# Patient Record
Sex: MALE | Race: WHITE | Employment: FULL TIME | URBAN - METROPOLITAN AREA
[De-identification: names, ages, dates, MRNs, and addresses within clinical notes are randomized per-mention and may not be internally consistent; named-entity substitution may affect disease eponyms.]

---

## 2018-11-07 ENCOUNTER — ANESTHESIA (OUTPATIENT)
Dept: CARDIAC CATH/INVASIVE PROCEDURES | Age: 58
End: 2018-11-07

## 2018-11-07 ENCOUNTER — HOSPITAL ENCOUNTER (OUTPATIENT)
Dept: CARDIAC CATH/INVASIVE PROCEDURES | Age: 58
Discharge: HOME | End: 2018-11-07
Payer: COMMERCIAL

## 2018-11-07 ENCOUNTER — ANESTHESIA EVENT (OUTPATIENT)
Dept: CARDIAC CATH/INVASIVE PROCEDURES | Age: 58
End: 2018-11-07

## 2018-11-07 VITALS
SYSTOLIC BLOOD PRESSURE: 122 MMHG | DIASTOLIC BLOOD PRESSURE: 66 MMHG | HEART RATE: 65 BPM | RESPIRATION RATE: 20 BRPM | BODY MASS INDEX: 25.06 KG/M2 | TEMPERATURE: 97.4 F | OXYGEN SATURATION: 97 % | WEIGHT: 185 LBS | HEIGHT: 72 IN

## 2018-11-07 VITALS — OXYGEN SATURATION: 96 % | SYSTOLIC BLOOD PRESSURE: 100 MMHG | DIASTOLIC BLOOD PRESSURE: 54 MMHG | TEMPERATURE: 95.4 F

## 2018-11-07 LAB
ACTIVATED CLOTTING TIME: 249 SEC (ref 79–149)
ACTIVATED CLOTTING TIME: 298 SEC (ref 79–149)
ACTIVATED CLOTTING TIME: 318 SEC (ref 79–149)
EKG ATRIAL RATE: 49 BPM
EKG P AXIS: 49 DEGREES
EKG P-R INTERVAL: 206 MS
EKG Q-T INTERVAL: 432 MS
EKG QRS DURATION: 110 MS
EKG QTC CALCULATION (BAZETT): 390 MS
EKG R AXIS: -34 DEGREES
EKG T AXIS: 50 DEGREES
EKG VENTRICULAR RATE: 49 BPM

## 2018-11-07 PROCEDURE — 2580000003 HC RX 258: Performed by: SPECIALIST

## 2018-11-07 PROCEDURE — 6360000002 HC RX W HCPCS

## 2018-11-07 PROCEDURE — 2709999900 HC NON-CHARGEABLE SUPPLY

## 2018-11-07 PROCEDURE — 85347 COAGULATION TIME ACTIVATED: CPT

## 2018-11-07 PROCEDURE — 93005 ELECTROCARDIOGRAM TRACING: CPT

## 2018-11-07 PROCEDURE — 6360000002 HC RX W HCPCS: Performed by: SPECIALIST

## 2018-11-07 PROCEDURE — 7100000010 HC PHASE II RECOVERY - FIRST 15 MIN

## 2018-11-07 PROCEDURE — 93613 INTRACARDIAC EPHYS 3D MAPG: CPT | Performed by: INTERNAL MEDICINE

## 2018-11-07 PROCEDURE — C1730 CATH, EP, 19 OR FEW ELECT: HCPCS

## 2018-11-07 PROCEDURE — 2500000003 HC RX 250 WO HCPCS: Performed by: SPECIALIST

## 2018-11-07 PROCEDURE — 33282 HC IMPANTABLE LOOP RECORDER: CPT | Performed by: INTERNAL MEDICINE

## 2018-11-07 PROCEDURE — 7100000011 HC PHASE II RECOVERY - ADDTL 15 MIN

## 2018-11-07 PROCEDURE — 93662 INTRACARDIAC ECG (ICE): CPT | Performed by: INTERNAL MEDICINE

## 2018-11-07 PROCEDURE — C1732 CATH, EP, DIAG/ABL, 3D/VECT: HCPCS

## 2018-11-07 PROCEDURE — C1733 CATH, EP, OTHR THAN COOL-TIP: HCPCS

## 2018-11-07 PROCEDURE — C1759 CATH, INTRA ECHOCARDIOGRAPHY: HCPCS

## 2018-11-07 PROCEDURE — 3700000000 HC ANESTHESIA ATTENDED CARE

## 2018-11-07 PROCEDURE — 2720000010 HC SURG SUPPLY STERILE

## 2018-11-07 PROCEDURE — 93656 COMPRE EP EVAL ABLTJ ATR FIB: CPT | Performed by: INTERNAL MEDICINE

## 2018-11-07 PROCEDURE — C1769 GUIDE WIRE: HCPCS

## 2018-11-07 PROCEDURE — C1894 INTRO/SHEATH, NON-LASER: HCPCS

## 2018-11-07 PROCEDURE — C1764 EVENT RECORDER, CARDIAC: HCPCS

## 2018-11-07 PROCEDURE — 6360000004 HC RX CONTRAST MEDICATION

## 2018-11-07 PROCEDURE — C1725 CATH, TRANSLUMIN NON-LASER: HCPCS

## 2018-11-07 PROCEDURE — 3700000001 HC ADD 15 MINUTES (ANESTHESIA)

## 2018-11-07 RX ORDER — MIDAZOLAM HYDROCHLORIDE 1 MG/ML
INJECTION INTRAMUSCULAR; INTRAVENOUS PRN
Status: DISCONTINUED | OUTPATIENT
Start: 2018-11-07 | End: 2018-11-07 | Stop reason: SDUPTHER

## 2018-11-07 RX ORDER — LIDOCAINE HYDROCHLORIDE 10 MG/ML
INJECTION, SOLUTION INFILTRATION; PERINEURAL PRN
Status: DISCONTINUED | OUTPATIENT
Start: 2018-11-07 | End: 2018-11-07 | Stop reason: SDUPTHER

## 2018-11-07 RX ORDER — SODIUM CHLORIDE 0.9 % (FLUSH) 0.9 %
10 SYRINGE (ML) INJECTION PRN
Status: DISCONTINUED | OUTPATIENT
Start: 2018-11-07 | End: 2018-11-08 | Stop reason: HOSPADM

## 2018-11-07 RX ORDER — METOPROLOL SUCCINATE 25 MG/1
25 TABLET, EXTENDED RELEASE ORAL DAILY
COMMUNITY

## 2018-11-07 RX ORDER — ACETAMINOPHEN 325 MG/1
650 TABLET ORAL EVERY 4 HOURS PRN
Status: DISCONTINUED | OUTPATIENT
Start: 2018-11-07 | End: 2018-11-08 | Stop reason: HOSPADM

## 2018-11-07 RX ORDER — FENTANYL CITRATE 50 UG/ML
INJECTION, SOLUTION INTRAMUSCULAR; INTRAVENOUS PRN
Status: DISCONTINUED | OUTPATIENT
Start: 2018-11-07 | End: 2018-11-07 | Stop reason: SDUPTHER

## 2018-11-07 RX ORDER — ROCURONIUM BROMIDE 10 MG/ML
INJECTION, SOLUTION INTRAVENOUS PRN
Status: DISCONTINUED | OUTPATIENT
Start: 2018-11-07 | End: 2018-11-07 | Stop reason: SDUPTHER

## 2018-11-07 RX ORDER — PROPOFOL 10 MG/ML
INJECTION, EMULSION INTRAVENOUS PRN
Status: DISCONTINUED | OUTPATIENT
Start: 2018-11-07 | End: 2018-11-07 | Stop reason: SDUPTHER

## 2018-11-07 RX ORDER — GLYCOPYRROLATE 1 MG/5 ML
SYRINGE (ML) INTRAVENOUS PRN
Status: DISCONTINUED | OUTPATIENT
Start: 2018-11-07 | End: 2018-11-07 | Stop reason: SDUPTHER

## 2018-11-07 RX ORDER — SODIUM CHLORIDE 9 MG/ML
INJECTION, SOLUTION INTRAVENOUS CONTINUOUS PRN
Status: DISCONTINUED | OUTPATIENT
Start: 2018-11-07 | End: 2018-11-07 | Stop reason: SDUPTHER

## 2018-11-07 RX ORDER — PROTAMINE SULFATE 10 MG/ML
INJECTION, SOLUTION INTRAVENOUS PRN
Status: DISCONTINUED | OUTPATIENT
Start: 2018-11-07 | End: 2018-11-07 | Stop reason: SDUPTHER

## 2018-11-07 RX ORDER — SODIUM CHLORIDE 0.9 % (FLUSH) 0.9 %
10 SYRINGE (ML) INJECTION EVERY 12 HOURS SCHEDULED
Status: DISCONTINUED | OUTPATIENT
Start: 2018-11-07 | End: 2018-11-08 | Stop reason: HOSPADM

## 2018-11-07 RX ORDER — SODIUM CHLORIDE 9 MG/ML
INJECTION, SOLUTION INTRAVENOUS CONTINUOUS
Status: DISCONTINUED | OUTPATIENT
Start: 2018-11-07 | End: 2018-11-08 | Stop reason: HOSPADM

## 2018-11-07 RX ORDER — MECLIZINE HCL 12.5 MG/1
12.5 TABLET ORAL 2 TIMES DAILY
COMMUNITY

## 2018-11-07 RX ORDER — NEOSTIGMINE METHYLSULFATE 5 MG/5 ML
SYRINGE (ML) INTRAVENOUS PRN
Status: DISCONTINUED | OUTPATIENT
Start: 2018-11-07 | End: 2018-11-07 | Stop reason: SDUPTHER

## 2018-11-07 RX ORDER — LISINOPRIL 2.5 MG/1
2.5 TABLET ORAL DAILY
COMMUNITY

## 2018-11-07 RX ORDER — HEPARIN SODIUM 1000 [USP'U]/ML
INJECTION, SOLUTION INTRAVENOUS; SUBCUTANEOUS PRN
Status: DISCONTINUED | OUTPATIENT
Start: 2018-11-07 | End: 2018-11-07 | Stop reason: SDUPTHER

## 2018-11-07 RX ADMIN — FENTANYL CITRATE 50 MCG: 50 INJECTION INTRAMUSCULAR; INTRAVENOUS at 11:48

## 2018-11-07 RX ADMIN — ROCURONIUM BROMIDE 40 MG: 10 INJECTION INTRAVENOUS at 11:53

## 2018-11-07 RX ADMIN — PHENYLEPHRINE HYDROCHLORIDE 50 MCG/MIN: 10 INJECTION INTRAVENOUS at 12:29

## 2018-11-07 RX ADMIN — FENTANYL CITRATE 50 MCG: 50 INJECTION INTRAMUSCULAR; INTRAVENOUS at 11:53

## 2018-11-07 RX ADMIN — SODIUM CHLORIDE: 9 INJECTION, SOLUTION INTRAVENOUS at 11:02

## 2018-11-07 RX ADMIN — MIDAZOLAM HYDROCHLORIDE 2 MG: 1 INJECTION, SOLUTION INTRAMUSCULAR; INTRAVENOUS at 11:47

## 2018-11-07 RX ADMIN — HEPARIN SODIUM 12000 UNITS: 1000 INJECTION, SOLUTION INTRAVENOUS; SUBCUTANEOUS at 12:17

## 2018-11-07 RX ADMIN — PROPOFOL 150 MG: 10 INJECTION, EMULSION INTRAVENOUS at 11:53

## 2018-11-07 RX ADMIN — PHENYLEPHRINE HYDROCHLORIDE 200 MCG: 10 INJECTION INTRAVENOUS at 12:19

## 2018-11-07 RX ADMIN — Medication 4 MG: at 13:36

## 2018-11-07 RX ADMIN — Medication 250 MG: at 13:30

## 2018-11-07 RX ADMIN — HEPARIN SODIUM 5000 UNITS: 1000 INJECTION, SOLUTION INTRAVENOUS; SUBCUTANEOUS at 12:30

## 2018-11-07 RX ADMIN — PHENYLEPHRINE HYDROCHLORIDE 200 MCG: 10 INJECTION INTRAVENOUS at 12:34

## 2018-11-07 RX ADMIN — LIDOCAINE HYDROCHLORIDE 50 MG: 10 INJECTION, SOLUTION INFILTRATION; PERINEURAL at 11:53

## 2018-11-07 RX ADMIN — Medication 0.8 MG: at 13:34

## 2018-11-07 RX ADMIN — SODIUM CHLORIDE: 900 INJECTION INTRAVENOUS at 11:42

## 2018-11-07 RX ADMIN — PROTAMINE SULFATE 50 MG: 10 INJECTION, SOLUTION INTRAVENOUS at 13:24

## 2018-11-07 ASSESSMENT — PULMONARY FUNCTION TESTS
PIF_VALUE: 14
PIF_VALUE: 1
PIF_VALUE: 14
PIF_VALUE: 1
PIF_VALUE: 14
PIF_VALUE: 4
PIF_VALUE: 14
PIF_VALUE: 15
PIF_VALUE: 14
PIF_VALUE: 12
PIF_VALUE: 14
PIF_VALUE: 15
PIF_VALUE: 14
PIF_VALUE: 13
PIF_VALUE: 1
PIF_VALUE: 14
PIF_VALUE: 14
PIF_VALUE: 4
PIF_VALUE: 13
PIF_VALUE: 14
PIF_VALUE: 4
PIF_VALUE: 14
PIF_VALUE: 13
PIF_VALUE: 14
PIF_VALUE: 15
PIF_VALUE: 14
PIF_VALUE: 15
PIF_VALUE: 3
PIF_VALUE: 1
PIF_VALUE: 1
PIF_VALUE: 2
PIF_VALUE: 14
PIF_VALUE: 4
PIF_VALUE: 14
PIF_VALUE: 5
PIF_VALUE: 14
PIF_VALUE: 4
PIF_VALUE: 14
PIF_VALUE: 14
PIF_VALUE: 1
PIF_VALUE: 13
PIF_VALUE: 15
PIF_VALUE: 13
PIF_VALUE: 13
PIF_VALUE: 14
PIF_VALUE: 13
PIF_VALUE: 13
PIF_VALUE: 4
PIF_VALUE: 14
PIF_VALUE: 13
PIF_VALUE: 14
PIF_VALUE: 13
PIF_VALUE: 0
PIF_VALUE: 14
PIF_VALUE: 15
PIF_VALUE: 10
PIF_VALUE: 4
PIF_VALUE: 14
PIF_VALUE: 13
PIF_VALUE: 2
PIF_VALUE: 14
PIF_VALUE: 0
PIF_VALUE: 14
PIF_VALUE: 2
PIF_VALUE: 13
PIF_VALUE: 13
PIF_VALUE: 14
PIF_VALUE: 1
PIF_VALUE: 14
PIF_VALUE: 14
PIF_VALUE: 13
PIF_VALUE: 14
PIF_VALUE: 13
PIF_VALUE: 14
PIF_VALUE: 19
PIF_VALUE: 14
PIF_VALUE: 3
PIF_VALUE: 13
PIF_VALUE: 13
PIF_VALUE: 4
PIF_VALUE: 1
PIF_VALUE: 14
PIF_VALUE: 13
PIF_VALUE: 14
PIF_VALUE: 14
PIF_VALUE: 1
PIF_VALUE: 14
PIF_VALUE: 13
PIF_VALUE: 17
PIF_VALUE: 12
PIF_VALUE: 4
PIF_VALUE: 14
PIF_VALUE: 12
PIF_VALUE: 14
PIF_VALUE: 14
PIF_VALUE: 13
PIF_VALUE: 13
PIF_VALUE: 14
PIF_VALUE: 13
PIF_VALUE: 13
PIF_VALUE: 14

## 2018-11-07 NOTE — OP NOTE
PROCEDURE:  Loop Recorder Implantation    PERFORMED BY:  Frances Boone M.D. INDICATIONS:atrial fibrillation. Brief history: The risks, indications, benefits, as well as alternatives of the procedure  had been discussed with the patient. The risks include bleeding,  infection, and chronic pain at the site. The patient understood and wished  to proceed. The area of the left parasternal region was infiltrated with 1% lidocaine. A 1cm incision was made and the loop recorder was implanted in the left  parasternal region. This was a [employer], serial number  [id number]. Sensing was 0.9mV. The patient tolerated the procedure well without any apparent complications. IMPRESSION:  Successful implantation of a loop recorder for atrial fibrillation.
cycle length was 490 ms. AV node ERP was 440 ms at a 700 ms drivetrain. A 3D map of the left atrium was formed using the CARTO mapping system. All 4  pulmonary veins were identified and mapped. The left atrial appendage was  also identified. I then exchanged the SL-1 sheath for the cryo sheath. The cryoablation catheter was then into the left superior pulmonary vein. Cryoablation of the left superior pulmonary vein was done. Two ablations  were done,  180 seconds and then for 120ms. The lowest temperature recorded was -52  degrees Celsius. This vein was completely isolated after cryoablation. There was no change in esophageal temperature. The left inferior pulmonary vein was then accessed. A single ablation was done with complete isolation of the vein. The lowest temperature recorded was -47  degrees Celsius. Again, there was no change in esophageal temperature. Next, we accessed the right inferior pulmonary vein. Cryoablation was done here. A single cryoablation was done with complete venous isolation. We then went up to the right superior pulmonary vein. Two cryoablations were done here. Please note that during ablation on the  right pulmonary veins the spiral catheter was advanced to the superior vena  cava and the phrenic nerve was continuously paced during the procedure. The lowest temperature recorded in the right superior vein was -43 degrees  Celsius. Burst pacing from the coronary sinus was done after about 200 ms and atrial fibrillation was induced. Cryoablation of the roof was done to isolate the roof. Patient tolerated the procedure well without any apparent complications. All the veins were again mapped with the spiral catheter and all veins were completely isolated. The procedure was then concluded at this time. IMPRESSION:  1. Successful cryoablation of atrial fibrillation. 2.  Comprehensive electrophysiology study.   3.  A 3D mapping with an ABBY mapping

## 2018-11-07 NOTE — PROGRESS NOTES
Figure eight suture removed, band aid applied. Groin soft, no bleeding or hematoma noted.   Pedal pulses palpable

## 2018-11-07 NOTE — ANESTHESIA PROCEDURE NOTES
Arterial Line:    An arterial line was placed using surface landmarks, in the pre-op for the following indication(s): continuous blood pressure monitoring. A 20 gauge (size), 1 and 3/4 inch (length), Arrow (type) catheter was placed, Seldinger technique used, into the right radial artery, secured by Tegaderm and tape. Anesthesia type: General    Events:  patient tolerated procedure well with no complications.   11/7/2018 11:58 AM11/7/2018 12:00 PM  Resident/CRNA: FRANKIE MESSER  Performed: Resident/CRNA and Anesthesiologist   Preanesthetic Checklist  Completed: patient identified, site marked, surgical consent, pre-op evaluation, timeout performed, IV checked, risks and benefits discussed, monitors and equipment checked, anesthesia consent given, oxygen available and patient being monitored

## 2018-11-07 NOTE — H&P
Marielena Strong MD       [facility] Meds:   Current Facility-Administered Medications   Medication Dose Route Frequency Provider Last Rate Last Dose    acetaminophen (TYLENOL) tablet 650 mg 650 mg oral Q4H PRN Caesar Passe,  mg at 10/28/18 2028   Or    acetaminophen (TYLENOL) suppository 650 mg 650 mg rectal Q4H PRN Caesar Passe, DO    acetaminophen (TYLENOL) tablet 325 mg 325 mg oral Q6H PRN Kimberly Asencio MD    aspirin EC tablet 81 mg 81 mg oral Daily Nasar Ali, DO 81 mg at 10/29/18 3145    lisinopril (PRINIVIL,ZESTRIL) tablet 2.5 mg 2.5 mg oral Daily Qing Montanez MD    metoprolol succinate XL (TOPROL-XL) 24 hr tablet 25 mg 25 mg oral Daily Qing Montanez MD    ondansetron (PF) (ZOFRAN) injection 4 mg 4 mg intravenous Q6H PRN Néstorar Passe, DO    rivaroxaban (XARELTO) tablet 20 mg 20 mg oral Daily with dinner Katherine Wynn MD 20 mg at 10/28/18 1819    scopolamine (TRANSDERM-SCOP) 1 mg/3 days 1 patch 1 patch transdermal Q72H Marielena Strong MD 1 patch at 10/27/18 1740    sennosides-docusate sodium (SENOKOT-S) 8.6-50 mg 1 tablet 1 tablet oral Q12H PRN Néstorar Passe, DO     Social History:   Social History     Socioeconomic History    Marital status:    Spouse name: Not on file    Number of children: Not on file    Years of education: Not on file    Highest education level: Not on file   Social Needs    Financial resource strain: Not on file    Food insecurity - worry: Not on file    Food insecurity - inability: Not on file   [employer] needs - medical: Not on file   [employer] needs - non-medical: Not on file   Occupational History    Not on file   Tobacco Use    Smoking status: Never Smoker    Smokeless tobacco: Never Used   Substance and Sexual Activity    Alcohol use:  Yes   Alcohol/week: 0.0 oz    Drug use: No    Sexual activity: Yes   Partners: Female   Other Topics Concern    Not on file   Social History Narrative    Not on file     Family History:   Family History   Problem

## 2018-11-07 NOTE — ANESTHESIA PRE PROCEDURE
Department of Anesthesiology  Preprocedure Note       Name:  Mark Anthony Burnett   Age:  66 y.o.  :  1960                                          MRN:  [id number]         Date:  2018      Surgeon: * Surgery not found *    Procedure: LOLI A-FIB ABLATION W/ANES    Medications prior to admission:   Prior to Admission medications    Not on File       Current medications:    No current outpatient prescriptions on file. Current Facility-Administered Medications   Medication Dose Route Frequency Provider Last Rate Last Dose    0.9 % sodium chloride infusion   Intravenous Continuous Ivy Mujica MD           Allergies: Allergies   Allergen Reactions    Shrimp (Diagnostic)      Upset stomach       Problem List:  There is no problem list on file for this patient. Past Medical History:        Diagnosis Date    Atrial fibrillation ([facility] [address] 2017    Cardiomyopathy ([facility] [address]     Eczema     History of labyrinthitis     Vertigo        Past Surgical History:  No past surgical history on file. Social History:    Social History   Substance Use Topics    Smoking status: Not on file    Smokeless tobacco: Not on file    Alcohol use Not on file                                Counseling given: Not Answered      Vital Signs (Current):   Vitals:    18 1015   BP: 120/83   Pulse: 54   Resp: 16   Temp: 98 °F (36.7 °C)   TempSrc: Oral   SpO2: 100%   Weight: 185 lb (83.9 kg)   Height: 5' 11.5\" (1.816 m)                                              BP Readings from Last 3 Encounters:   18 120/83       NPO Status: Time of last liquid consumption:                         Time of last solid consumption:                         Date of last liquid consumption: 18                        Date of last solid food consumption: 18    BMI:   Wt Readings from Last 3 Encounters:   18 185 lb (83.9 kg)     Body mass index is 25.44 kg/m².     CBC: No results found for: WBC, RBC, HGB, HCT, MCV, RDW,

## 2018-11-07 NOTE — PROGRESS NOTES
Right radial artline removed from. Manual pressure held for 10 minutes. Pressure dressing applied, site sift, no bleeding or hematoma note.

## 2018-11-08 LAB
EKG ATRIAL RATE: 63 BPM
EKG P AXIS: 57 DEGREES
EKG P-R INTERVAL: 184 MS
EKG Q-T INTERVAL: 418 MS
EKG QRS DURATION: 110 MS
EKG QTC CALCULATION (BAZETT): 427 MS
EKG R AXIS: -9 DEGREES
EKG T AXIS: 53 DEGREES
EKG VENTRICULAR RATE: 63 BPM

## 2023-03-02 ENCOUNTER — TELEPHONE (OUTPATIENT)
Dept: DERMATOLOGY | Age: 63
End: 2023-03-02

## 2023-03-02 NOTE — TELEPHONE ENCOUNTER
Re: Yesi Sánchez,  64 ----  Patient called the afterhours on 3/2/23 at 6:16pm, to cancel the appointment with Oskar VO, scheduled at the [facility] location tomorrow, 3/3/23 at 9:45am, due to a work conflict. Pt states he is a  and the school is on a 2hr delay. Patient mentioned he would like to get a later appt. Writer referred him to call office. Pt's ph: [phone] to further discuss.   EM

## 2023-03-07 ENCOUNTER — OFFICE VISIT (OUTPATIENT)
Dept: DERMATOLOGY | Age: 63
End: 2023-03-07
Payer: COMMERCIAL

## 2023-03-07 VITALS
HEIGHT: 71 IN | BODY MASS INDEX: 26.99 KG/M2 | HEART RATE: 54 BPM | WEIGHT: 192.8 LBS | OXYGEN SATURATION: 99 % | TEMPERATURE: 98.7 F | SYSTOLIC BLOOD PRESSURE: 105 MMHG | DIASTOLIC BLOOD PRESSURE: 68 MMHG

## 2023-03-07 DIAGNOSIS — Z80.8 FAMILY HISTORY OF MELANOMA: Primary | ICD-10-CM

## 2023-03-07 DIAGNOSIS — D48.9 NEOPLASM OF UNCERTAIN BEHAVIOR: ICD-10-CM

## 2023-03-07 DIAGNOSIS — L57.0 ACTINIC KERATOSES: ICD-10-CM

## 2023-03-07 DIAGNOSIS — L82.1 SEBORRHEIC KERATOSES: ICD-10-CM

## 2023-03-07 DIAGNOSIS — D22.9 MULTIPLE BENIGN NEVI: ICD-10-CM

## 2023-03-07 PROCEDURE — 99203 OFFICE O/P NEW LOW 30 MIN: CPT | Performed by: PHYSICIAN ASSISTANT

## 2023-03-07 PROCEDURE — 11102 TANGNTL BX SKIN SINGLE LES: CPT | Performed by: PHYSICIAN ASSISTANT

## 2023-03-07 RX ORDER — ASPIRIN 325 MG
325 TABLET ORAL DAILY
COMMUNITY

## 2023-03-07 RX ORDER — FLUOROURACIL 50 MG/G
CREAM TOPICAL
Qty: 40 G | Refills: 0 | Status: SHIPPED | OUTPATIENT
Start: 2023-03-07

## 2023-03-07 RX ORDER — LIDOCAINE HYDROCHLORIDE 10 MG/ML
0.5 INJECTION, SOLUTION INFILTRATION; PERINEURAL ONCE
Status: SHIPPED | OUTPATIENT
Start: 2023-03-07

## 2023-03-07 NOTE — PROGRESS NOTES
Dermatology Patient Note  [facility] [address] [place] [address]  Dept: [phone]  Dept Fax: [phone]      VISITDATE: 3/7/2023   REFERRING PROVIDER: No ref. provider found      Sandy Galeas is a 63 y.o. male  who presents today in the office for:    New Patient (FBSE Family hx of melanoma. Pt states he has a spot on his rt forehead and lt shoulder that are concerning )      HISTORY OF PRESENT ILLNESS:  As above. Extensive FH/O melanoma (immediate relatives x 3). Pt has scaly lesions on forehead that he uses moisturizers on, but states that they \"never go away\". Scaly lesion on left posterior shoulder x years. Failed prior LN2. MEDICAL PROBLEMS:  There are no problems to display for this patient. CURRENT MEDICATIONS:   Current Outpatient Medications   Medication Sig Dispense Refill    aspirin 325 MG tablet Take 325 mg by mouth daily      fluorouracil (EFUDEX) 5 % cream Apply twice daily, to scaly lesions on forehead, for 3 weeks. Expected redness and irritation. 40 g 0    lisinopril (PRINIVIL;ZESTRIL) 2.5 MG tablet Take 2.5 mg by mouth daily      metoprolol succinate (TOPROL XL) 25 MG extended release tablet Take 25 mg by mouth daily      Dofetilide (TIKOSYN PO) Take 500 mg by mouth 2 times daily  (Patient not taking: Reported on 3/7/2023)      meclizine (ANTIVERT) 12.5 MG tablet Take 12.5 mg by mouth 2 times daily (Patient not taking: Reported on 3/7/2023)       Current Facility-Administered Medications   Medication Dose Route Frequency Provider Last Rate Last Admin    lidocaine 1 % injection 0.5 mL  0.5 mL IntraDERmal Once Blinda LILY Savage           ALLERGIES:   Allergies   Allergen Reactions    Shrimp (Diagnostic)      Upset stomach       SOCIAL HISTORY:  Social History     Tobacco Use    Smoking status: Never    Smokeless tobacco: Never   Substance Use Topics    Alcohol use:  Yes Pertinent ROS:  Review of Systems  Skin: Denies any new changing, growing or bleeding lesions or rashes except as described in the HPI   Constitutional: Denies fevers, chills, and malaise. PHYSICAL EXAM:   /68   Pulse 54   Temp 98.7 °F (37.1 °C) (Temporal)   Ht 5' 11\" (1.803 m)   Wt 192 lb 12.8 oz (87.5 kg)   SpO2 99%   BMI 26.89 kg/m²     The patient is generally well appearing, well nourished, alert and conversational. Affect is normal.    Cutaneous Exam:  Physical Exam  Total body skin exam excluding external genitalia: head/face, neck, both arms, chest, back, abdomen, both legs, buttocks, digits and/or nails, was examined. Genital exam was deferred as patient denied having any lesions in this area. Complete visualization of scalp may be limited by hair density, length, and/or style    Facial covering was removed during examination. Diagnoses/exam findings/medical history pertinent to this visit are listed below:    Assessment:   Diagnosis Orders   1. Family history of melanoma        2. Seborrheic keratoses        3. Actinic keratoses  fluorouracil (EFUDEX) 5 % cream      4. Multiple benign nevi        5. Neoplasm of uncertain behavior  SD TANGENTIAL BIOPSY SKIN SINGLE LESION    lidocaine 1 % injection 0.5 mL    Surgical Pathology           Plan:  1. Family history of melanoma  - patient was counseled that UV-damaged skin increases lifetime risk for skin cancer  - I recommended the patient apply broad spectrum spf 30+ sunscreen daily, reapplying every 2 hours. - In additional to regular use of sunscreen, I recommended broad-rimmed hats, long sleeves, and seeking the shade. 2. Seborrheic keratoses  - reassurance and education     3. Actinic keratoses  - fluorouracil (EFUDEX) 5 % cream; Apply twice daily, to scaly lesions on forehead, for 3 weeks. Expected redness and irritation. Dispense: 40 g; Refill: 0    4. Multiple benign nevi  - reassurance and education     5.  Neoplasm of uncertain behavior  Shave Biopsy (Left upper back - r/o AN): The procedure and its risks were explained including but not limited to pain, bleeding, infection, permanent scar, permanent pigment alteration and need for an additional procedure. Consent to proceed with the procedure was obtained from the patient or the parent. After cleaning with alcohol the lesion was anesthetized with 1% lidocaine with epinephrine and was removed with a dermablade. Hemostasis was achieved with aluminum chloride and Vaseline and a bandage were applied.   - OK TANGENTIAL BIOPSY SKIN SINGLE LESION  - lidocaine 1 % injection 0.5 mL  - Surgical Pathology; Future       RTC 6M    No future appointments. There are no Patient Instructions on file for this visit.       Electronically signed by Gladys Pedroza PA-C on 3/7/23 at 11:25 AM EST

## 2023-03-08 ENCOUNTER — NURSE ONLY (OUTPATIENT)
Dept: LAB | Age: 63
End: 2023-03-08

## 2023-03-15 NOTE — RESULT ENCOUNTER NOTE
We have received and reviewed your biopsy results, which demonstrated a benign skin lesion called a benign compound nevus. No further Tx is required for this lesion.